# Patient Record
Sex: MALE | Race: WHITE | NOT HISPANIC OR LATINO | Employment: OTHER | URBAN - METROPOLITAN AREA
[De-identification: names, ages, dates, MRNs, and addresses within clinical notes are randomized per-mention and may not be internally consistent; named-entity substitution may affect disease eponyms.]

---

## 2019-09-30 ENCOUNTER — HOSPITAL ENCOUNTER (EMERGENCY)
Facility: HOSPITAL | Age: 54
Discharge: HOME | End: 2019-09-30
Attending: EMERGENCY MEDICINE
Payer: MEDICARE

## 2019-09-30 ENCOUNTER — APPOINTMENT (EMERGENCY)
Dept: RADIOLOGY | Facility: HOSPITAL | Age: 54
End: 2019-09-30
Payer: MEDICARE

## 2019-09-30 VITALS
TEMPERATURE: 99.2 F | HEART RATE: 82 BPM | OXYGEN SATURATION: 97 % | HEIGHT: 69 IN | RESPIRATION RATE: 18 BRPM | DIASTOLIC BLOOD PRESSURE: 81 MMHG | BODY MASS INDEX: 30.36 KG/M2 | WEIGHT: 205 LBS | SYSTOLIC BLOOD PRESSURE: 142 MMHG

## 2019-09-30 DIAGNOSIS — T81.30XA WOUND DEHISCENCE: Primary | ICD-10-CM

## 2019-09-30 PROCEDURE — 99282 EMERGENCY DEPT VISIT SF MDM: CPT | Performed by: EMERGENCY MEDICINE

## 2019-09-30 PROCEDURE — 99283 EMERGENCY DEPT VISIT LOW MDM: CPT

## 2019-09-30 PROCEDURE — 73030 X-RAY EXAM OF SHOULDER: CPT

## 2019-09-30 PROCEDURE — 12001 RPR S/N/AX/GEN/TRNK 2.5CM/<: CPT | Performed by: EMERGENCY MEDICINE

## 2019-09-30 NOTE — ED NOTES
Breakthrough bleeding noted   Surgifoam replaced with additional 4x4     Harmony Ponce RN  09/30/19 7631

## 2019-09-30 NOTE — ED PROVIDER NOTES
History  Chief Complaint   Patient presents with    Post-op Problem     pt had a shoulder surgery last thursday and is now bleeding from the incision site  79-year-old male presenting to the emergency department for evaluation of postop bleeding  This past Thursday, at [facility] with a doctor Noemí Singletary, the patient had shoulder replacement surgery, this morning he had some slight bleeding from the anterior surgical incision site, a since started to the poor out more profusely and so came to the emergency department, he has had no fevers or chills, no discharge  No increased pain at the surgical site  Otherwise healing well  None       Past Medical History:   Diagnosis Date    Diabetes mellitus ([facility] [address] )     Hypertension        History reviewed  No pertinent surgical history  History reviewed  No pertinent family history  I have reviewed and agree with the history as documented      Social History     Tobacco Use    Smoking status: Never Smoker    Smokeless tobacco: Never Used   Substance Use Topics    Alcohol use: Never     Frequency: Never    Drug use: Never        Review of Systems    Physical Exam  Physical Exam    Vital Signs  ED Triage Vitals   Temperature Pulse Respirations Blood Pressure SpO2   09/30/19 1643 09/30/19 1643 09/30/19 1643 09/30/19 1643 09/30/19 1643   99 2 °F (37 3 °C) 89 18 146/87 98 %      Temp Source Heart Rate Source Patient Position - Orthostatic VS BP Location FiO2 (%)   09/30/19 1643 09/30/19 1643 09/30/19 1643 09/30/19 1643 --   Oral Monitor Sitting Left arm       Pain Score       09/30/19 1736       No Pain           Vitals:    09/30/19 1643 09/30/19 1736   BP: 146/87 142/81   Pulse: 89 82   Patient Position - Orthostatic VS: Sitting Sitting         Visual Acuity      ED Medications  Medications - No data to display    Diagnostic Studies  Results Reviewed     None                 XR shoulder 2+ views RIGHT   ED Interpretation by Catherine Amador MD (09/30 1731) Hardware intact, no acute osseous abnormality  Final Result by Khadijah Hampton MD (09/30 1858)      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  Workstation performed: [id number]                    Procedures  Laceration repair  Date/Time: 9/30/2019 6:19 PM  Performed by: Kathleen Blankenship MD  Authorized by: Kathleen Blankenship MD   Consent: Verbal consent obtained  Risks and benefits: risks, benefits and alternatives were discussed  Consent given by: patient  Patient understanding: patient states understanding of the procedure being performed  Required items: required blood products, implants, devices, and special equipment available  Body area: upper extremity (5 mm surgical site dehisence )  Laceration length: 0 5 cm  Foreign bodies: no foreign bodies  Tendon involvement: none  Nerve involvement: none  Vascular damage: no    Wound Dehiscence:  Superficial Wound Dehiscence: simple closure      Procedure Details:  Preparation: Patient was prepped and draped in the usual sterile fashion  Debridement: none  Degree of undermining: none  Skin closure: 5-0 nylon  Number of sutures: 1  Suture technique: figure 8   Approximation: close  Approximation difficulty: simple  Dressing: 4x4 sterile gauze (surgifoam)  Patient tolerance: Patient tolerated the procedure well with no immediate complications             ED Course  ED Course as of Oct 11 1555   Mon Sep 30, 2019   1730 Awaiting callback from Orthopedic surgery      [phone] Blood through surgical home, replaced surgeon from dressing new 1  Bleeding seems to have stopped for now, awaiting callback from Orthopedic surgery  3080 Received a call back from Dr Aaron Diver min, he did in fact recommended figure-of-eight stitch, which I performed and the bleeding has now stopped, will discharge with Dr Rj Hodge follow-up as scheduled                                    MDM  Number of Diagnoses or Management Options  Wound dehiscence:   Diagnosis management comments: 80-year-old male with bleeding from superficial surgical site wound dehiscence, after discussion with orthopedic surgeon who performed the original surgery, it was agreed that the best course of action was wound closure with figure-of-eight suture which was performed, bleeding controlled, will follow up with Orthopedic surgery as outpatient      Disposition  Final diagnoses:   Wound dehiscence     Time reflects when diagnosis was documented in both MDM as applicable and the Disposition within this note     Time User Action Codes Description Comment    9/30/2019  6:21 PM Jani, [address] [place] [address] Wound dehiscence       ED Disposition     ED Disposition Condition Date/Time Comment    Discharge Stable Mon Sep 30, 2019  6:21 PM Thelma Soria discharge to home/self care  Follow-up Information     Follow up With Specialties Details Why Contact Info    Melissa Suazo MD Orthopedic Surgery   [id number] [place] [address]            There are no discharge medications for this patient  No discharge procedures on file      ED Provider  Electronically Signed by           Lola Mendiola MD  10/11/19 0677

## 2019-10-05 ENCOUNTER — HOSPITAL ENCOUNTER (EMERGENCY)
Facility: HOSPITAL | Age: 54
Discharge: HOME | End: 2019-10-05
Attending: EMERGENCY MEDICINE
Payer: MEDICARE

## 2019-10-05 VITALS
DIASTOLIC BLOOD PRESSURE: 82 MMHG | TEMPERATURE: 98.3 F | SYSTOLIC BLOOD PRESSURE: 155 MMHG | HEART RATE: 86 BPM | RESPIRATION RATE: 18 BRPM | HEIGHT: 69 IN | OXYGEN SATURATION: 93 % | BODY MASS INDEX: 30.27 KG/M2

## 2019-10-05 DIAGNOSIS — R58 BLEEDING: Primary | ICD-10-CM

## 2019-10-05 PROCEDURE — 99283 EMERGENCY DEPT VISIT LOW MDM: CPT | Performed by: EMERGENCY MEDICINE

## 2019-10-05 PROCEDURE — 99283 EMERGENCY DEPT VISIT LOW MDM: CPT

## 2019-10-05 PROCEDURE — 10140 I&D HMTMA SEROMA/FLUID COLLJ: CPT | Performed by: EMERGENCY MEDICINE

## 2019-10-05 NOTE — ED PROVIDER NOTES
History  Chief Complaint   Patient presents with    Shoulder Pain     pt presents to ed with right shoulder pain and bleeding , had sx on 09/26/2019, pt states i was here this past monday for the same problem and it has not resolved     HPI patient is a 51-year-old male, he reports he had a shoulder replacement surgery on September 26th at an outside orthopedic office coordinated [address]  Patient reports since then he has had a bleeding problem at the surgical site  Patient reports there is an area of the wound that continues to open and bleed fairly frequently  He denies any trauma to the area  Denies any fever or chills  He reports most of his incision is opposed but he reports the small areas open  Patient was seen here in the emergency department on September 30th and had a pressure dressing placed in that area for hemostasis but reports intermittent bleeding after that  Patient was apparently seen by his orthopedist this week and had multiple Steri-Strips placed but reports still bleeding  Patient denies any anticoagulants  Denies any numbness or weakness down his arm  He reports some ecchymosis of the skin but denies any purulent drainage  Past medical history of diabetes hypertension shoulder surgery  Family history noncontributory  Social history, nonsmoker no history of drug abuse    None       Past Medical History:   Diagnosis Date    Diabetes mellitus ([facility] [address] )     Hypertension        History reviewed  No pertinent surgical history  History reviewed  No pertinent family history  I have reviewed and agree with the history as documented  Social History     Tobacco Use    Smoking status: Never Smoker    Smokeless tobacco: Never Used   Substance Use Topics    Alcohol use: Never     Frequency: Never    Drug use: Never        Review of Systems   Constitutional: Positive for chills  Negative for fatigue and fever  Skin: Positive for wound     Neurological: Negative for weakness and numbness  Physical Exam  Physical Exam   Constitutional: He appears well-developed and well-nourished  HENT:   Head: Normocephalic  Pulmonary/Chest: Effort normal    Skin:   There is a surgical incision over the anterior portion of the right shoulder with Steri-Strips applied, there was a small area at the top of the incision with a opening where the patient will leak fairly dark blood from consistent with a leaking hematoma  There is no pulsatile bleeding  There is no purulence there is no redness  Vital Signs  ED Triage Vitals [10/05/19 1228]   Temperature Pulse Respirations Blood Pressure SpO2   98 3 °F (36 8 °C) 86 18 155/82 93 %      Temp Source Heart Rate Source Patient Position - Orthostatic VS BP Location FiO2 (%)   Oral Monitor Sitting Left arm --      Pain Score       --           Vitals:    10/05/19 1228   BP: 155/82   Pulse: 86   Patient Position - Orthostatic VS: Sitting         Visual Acuity      ED Medications  Medications - No data to display    Diagnostic Studies  Results Reviewed     None                 No orders to display              Procedures  Procedures       ED Course                I was able to apply pressure and express the hematoma  With the surgical site opposed I was able to apply wound adhesive and was able to get hemostasis  Patient was dressed with  surgifoam              [facility] medical decision making 59-year-old male presents to the emergency department with active bleeding from his surgical site  Patient has a shoulder replacement surgery done at another facility  I was able to compresses hematoma and get hemostasis  We discussed outpatient treatment and follow-up we discussed indications to return      Disposition  Final diagnoses:   Bleeding - Postoperative right shoulder     Time reflects when diagnosis was documented in both MDM as applicable and the Disposition within this note     Time User Action Codes Description Comment    10/5/2019 12:53 PM Jayro Maile Govea [R58] Bleeding     10/5/2019 12:53 PM Stephanie Montoya [R58] Bleeding Postoperative right shoulder      ED Disposition     ED Disposition Condition Date/Time Comment    Discharge Stable Sat Oct 5, 2019 12:52 PM Fatuma Keating discharge to home/self care  Follow-up Information    None         There are no discharge medications for this patient  No discharge procedures on file      ED Provider  Electronically Signed by           Jessica Baptiste MD  10/05/19 2426